# Patient Record
Sex: FEMALE | URBAN - METROPOLITAN AREA
[De-identification: names, ages, dates, MRNs, and addresses within clinical notes are randomized per-mention and may not be internally consistent; named-entity substitution may affect disease eponyms.]

---

## 2018-09-06 ENCOUNTER — APPOINTMENT (OUTPATIENT)
Age: 83
Setting detail: DERMATOLOGY
End: 2018-09-06

## 2018-09-06 DIAGNOSIS — L82.0 INFLAMED SEBORRHEIC KERATOSIS: ICD-10-CM

## 2018-09-06 DIAGNOSIS — L57.0 ACTINIC KERATOSIS: ICD-10-CM

## 2018-09-06 DIAGNOSIS — L23.9 ALLERGIC CONTACT DERMATITIS, UNSPECIFIED CAUSE: ICD-10-CM

## 2018-09-06 PROBLEM — D48.5 NEOPLASM OF UNCERTAIN BEHAVIOR OF SKIN: Status: ACTIVE | Noted: 2018-09-06

## 2018-09-06 PROCEDURE — 11311 SHAVE SKIN LESION 0.6-1.0 CM: CPT

## 2018-09-06 PROCEDURE — OTHER SHAVE REMOVAL: OTHER

## 2018-09-06 PROCEDURE — 11311 SHAVE SKIN LESION 0.6-1.0 CM: CPT | Mod: 76

## 2018-09-06 PROCEDURE — OTHER PRESCRIPTION: OTHER

## 2018-09-06 PROCEDURE — OTHER COUNSELING: OTHER

## 2018-09-06 PROCEDURE — OTHER BIOPSY BY PUNCH METHOD: OTHER

## 2018-09-06 PROCEDURE — 11300 SHAVE SKIN LESION 0.5 CM/<: CPT

## 2018-09-06 PROCEDURE — 99202 OFFICE O/P NEW SF 15 MIN: CPT | Mod: 25

## 2018-09-06 PROCEDURE — 11100: CPT | Mod: 59

## 2018-09-06 PROCEDURE — OTHER MIPS QUALITY: OTHER

## 2018-09-06 RX ORDER — TRIAMCINOLONE ACETONIDE 1 MG/G
CREAM TOPICAL
Qty: 1 | Refills: 1 | Status: ERX | COMMUNITY
Start: 2018-09-06

## 2018-09-06 ASSESSMENT — LOCATION ZONE DERM
LOCATION ZONE: FACE
LOCATION ZONE: LEG
LOCATION ZONE: NOSE

## 2018-09-06 ASSESSMENT — LOCATION DETAILED DESCRIPTION DERM
LOCATION DETAILED: LEFT LATERAL DISTAL PRETIBIAL REGION
LOCATION DETAILED: RIGHT DISTAL LATERAL CALF
LOCATION DETAILED: NASAL ROOT
LOCATION DETAILED: RIGHT SUPERIOR LATERAL BUCCAL CHEEK
LOCATION DETAILED: LEFT DISTAL PRETIBIAL REGION

## 2018-09-06 ASSESSMENT — LOCATION SIMPLE DESCRIPTION DERM
LOCATION SIMPLE: NOSE
LOCATION SIMPLE: RIGHT CHEEK
LOCATION SIMPLE: RIGHT CALF
LOCATION SIMPLE: LEFT PRETIBIAL REGION

## 2018-09-06 NOTE — PROCEDURE: SHAVE REMOVAL
Bill For Surgical Tray: no
Wound Care: Petrolatum
Notification Instructions: Patient will be notified of biopsy results. However, patient instructed to call the office if not contacted within 2 weeks.
Was A Bandage Applied: Yes
Medical Necessity Clause: This procedure was medically necessary because the lesion that was treated was:
Anesthesia Type: 1% lidocaine with epinephrine
Biopsy Method: Dermablade
Size Of Lesion In Cm (Required): 0.7
Consent was obtained from the patient. The risks and benefits to therapy were discussed in detail. Specifically, the risks of infection, scarring, bleeding, prolonged wound healing, incomplete removal, allergy to anesthesia, nerve injury and recurrence were addressed. Prior to the procedure, the treatment site was clearly identified and confirmed by the patient. All components of Universal Protocol/PAUSE Rule completed.
Detail Level: Detailed
Medical Necessity Information: It is in your best interest to select a reason for this procedure from the list below. All of these items fulfill various CMS LCD requirements except the new and changing color options.
Post-Care Instructions: I reviewed with the patient in detail post-care instructions. Patient is to keep the biopsy site dry overnight, and then apply bacitracin twice daily until healed. Patient may apply hydrogen peroxide soaks to remove any crusting.
Path Notes (To The Dermatopathologist): Please check margins.
Size Of Lesion In Cm (Required): 0.5
X Size Of Lesion In Cm (Optional): 0
Billing Type: Third-Party Bill
Hemostasis: Drysol
Size Of Lesion In Cm (Required): 0.6

## 2018-09-06 NOTE — PROCEDURE: BIOPSY BY PUNCH METHOD
Epidermal Sutures: 4-0 Ethilon
Post-Care Instructions: I reviewed with the patient in detail post-care instructions. Patient is to keep the biopsy site dry overnight, and then apply bacitracin twice daily until healed. Patient may apply hydrogen peroxide soaks to remove any crusting.
Anesthesia Volume In Cc (Will Not Render If 0): 0.5
Bill For Surgical Tray: no
Biopsy Type: H and E
X Depth Of Punch In Cm (Optional): 0
Was A Bandage Applied: Yes
Punch Size In Mm: 3
Notification Instructions: Patient will be notified of biopsy results. However, patient instructed to call the office if not contacted within 2 weeks.
Billing Type: Third-Party Bill
Hemostasis: None
Dressing: bandage
Home Suture Removal Text: Patient was provided a home suture removal kit and will remove their sutures at home.  If they have any questions or difficulties they will call the office.
Wound Care: Petrolatum
Consent: Written consent was obtained and risks were reviewed including but not limited to scarring, infection, bleeding, scabbing, incomplete removal, nerve damage and allergy to anesthesia.
Anesthesia Type: 1% lidocaine with epinephrine
Suture Removal: 14 days
Detail Level: Detailed

## 2018-09-24 ENCOUNTER — APPOINTMENT (OUTPATIENT)
Age: 83
Setting detail: DERMATOLOGY
End: 2018-09-24

## 2018-09-24 DIAGNOSIS — L82.0 INFLAMED SEBORRHEIC KERATOSIS: ICD-10-CM

## 2018-09-24 DIAGNOSIS — Z48.02 ENCOUNTER FOR REMOVAL OF SUTURES: ICD-10-CM

## 2018-09-24 PROBLEM — C44.329 SQUAMOUS CELL CARCINOMA OF SKIN OF OTHER PARTS OF FACE: Status: ACTIVE | Noted: 2018-09-24

## 2018-09-24 PROBLEM — C44.712 BASAL CELL CARCINOMA OF SKIN OF RIGHT LOWER LIMB, INCLUDING HIP: Status: ACTIVE | Noted: 2018-09-24

## 2018-09-24 PROCEDURE — 99213 OFFICE O/P EST LOW 20 MIN: CPT

## 2018-09-24 PROCEDURE — OTHER COUNSELING: OTHER

## 2018-09-24 PROCEDURE — OTHER POST-OP WOUND EVALUATION: OTHER

## 2018-09-24 PROCEDURE — OTHER CONSULTATION FOR MOHS SURGERY: OTHER

## 2018-09-24 PROCEDURE — OTHER REFERRAL: OTHER

## 2018-09-24 PROCEDURE — OTHER SUTURE REMOVAL (GLOBAL PERIOD): OTHER

## 2018-09-24 PROCEDURE — OTHER PRESCRIPTION: OTHER

## 2018-09-24 PROCEDURE — OTHER MIPS QUALITY: OTHER

## 2018-09-24 RX ORDER — MUPIROCIN 20 MG/G
OINTMENT TOPICAL BID
Qty: 1 | Refills: 1 | Status: ERX | COMMUNITY
Start: 2018-09-24

## 2018-09-24 ASSESSMENT — LOCATION DETAILED DESCRIPTION DERM
LOCATION DETAILED: RIGHT SUPERIOR LATERAL BUCCAL CHEEK
LOCATION DETAILED: LEFT DISTAL PRETIBIAL REGION
LOCATION DETAILED: RIGHT DISTAL LATERAL CALF
LOCATION DETAILED: NASAL ROOT

## 2018-09-24 ASSESSMENT — LOCATION SIMPLE DESCRIPTION DERM
LOCATION SIMPLE: RIGHT CALF
LOCATION SIMPLE: LEFT PRETIBIAL REGION
LOCATION SIMPLE: RIGHT CHEEK
LOCATION SIMPLE: NOSE

## 2018-09-24 ASSESSMENT — LOCATION ZONE DERM
LOCATION ZONE: NOSE
LOCATION ZONE: FACE
LOCATION ZONE: LEG

## 2018-09-24 NOTE — PROCEDURE: POST-OP WOUND EVALUATION
Wound Diameter In Cm(Optional): 0
Wound Crusting?: clean
Detail Level: Detailed
Add 11618 Cpt? (Important Note: In 2017 The Use Of 79854 Is Being Tracked By Cms To Determine Future Global Period Reimbursement For Global Periods): no

## 2018-09-24 NOTE — PROCEDURE: SUTURE REMOVAL (GLOBAL PERIOD)
Add 69973 Cpt? (Important Note: In 2017 The Use Of 75509 Is Being Tracked By Cms To Determine Future Global Period Reimbursement For Global Periods): no
Detail Level: Detailed

## 2018-10-19 ENCOUNTER — RX ONLY (RX ONLY)
Age: 83
End: 2018-10-19

## 2018-10-19 RX ORDER — CLINDAMYCIN HYDROCHLORIDE 300 MG/1
2 CAPSULE ORAL QD
Qty: 2 | Refills: 0 | Status: ERX | COMMUNITY
Start: 2018-10-19

## 2018-10-23 ENCOUNTER — APPOINTMENT (OUTPATIENT)
Age: 83
Setting detail: DERMATOLOGY
End: 2018-10-23

## 2018-10-23 PROBLEM — C44.329 SQUAMOUS CELL CARCINOMA OF SKIN OF OTHER PARTS OF FACE: Status: ACTIVE | Noted: 2018-10-23

## 2018-10-23 PROCEDURE — 13131 CMPLX RPR F/C/C/M/N/AX/G/H/F: CPT

## 2018-10-23 PROCEDURE — OTHER REPAIR NOTE: OTHER

## 2018-10-23 PROCEDURE — 17311 MOHS 1 STAGE H/N/HF/G: CPT

## 2018-10-23 PROCEDURE — OTHER MOHS SURGERY: OTHER

## 2018-10-30 ENCOUNTER — RX ONLY (RX ONLY)
Age: 83
End: 2018-10-30

## 2018-10-30 ENCOUNTER — APPOINTMENT (OUTPATIENT)
Age: 83
Setting detail: DERMATOLOGY
End: 2018-10-30

## 2018-10-30 DIAGNOSIS — Z48.02 ENCOUNTER FOR REMOVAL OF SUTURES: ICD-10-CM

## 2018-10-30 PROCEDURE — 99024 POSTOP FOLLOW-UP VISIT: CPT

## 2018-10-30 PROCEDURE — OTHER MIPS QUALITY: OTHER

## 2018-10-30 PROCEDURE — OTHER SUTURE REMOVAL (GLOBAL PERIOD): OTHER

## 2018-10-30 RX ORDER — CLINDAMYCIN HYDROCHLORIDE 300 MG/1
2 CAPSULE ORAL QD
Qty: 2 | Refills: 0 | Status: ERX

## 2018-10-30 ASSESSMENT — LOCATION ZONE DERM: LOCATION ZONE: FACE

## 2018-10-30 ASSESSMENT — LOCATION DETAILED DESCRIPTION DERM: LOCATION DETAILED: RIGHT SUPERIOR LATERAL BUCCAL CHEEK

## 2018-10-30 ASSESSMENT — LOCATION SIMPLE DESCRIPTION DERM: LOCATION SIMPLE: RIGHT CHEEK

## 2018-10-30 NOTE — PROCEDURE: SUTURE REMOVAL (GLOBAL PERIOD)
Detail Level: Detailed
Add 73133 Cpt? (Important Note: In 2017 The Use Of 53766 Is Being Tracked By Cms To Determine Future Global Period Reimbursement For Global Periods): yes

## 2018-11-06 ENCOUNTER — APPOINTMENT (OUTPATIENT)
Age: 83
Setting detail: DERMATOLOGY
End: 2018-11-06

## 2018-11-06 PROBLEM — C44.712 BASAL CELL CARCINOMA OF SKIN OF RIGHT LOWER LIMB, INCLUDING HIP: Status: ACTIVE | Noted: 2018-11-06

## 2018-11-06 PROCEDURE — OTHER CRYOSURGICAL DESTRUCTION: OTHER

## 2018-11-06 PROCEDURE — 17263 DSTRJ MAL LES T/A/L 2.1-3.0: CPT

## 2018-11-06 PROCEDURE — OTHER PRESCRIPTION: OTHER

## 2018-11-06 RX ORDER — MUPIROCIN 20 MG/G
OINTMENT TOPICAL
Qty: 1 | Refills: 0 | Status: ERX

## 2018-11-06 NOTE — PROCEDURE: CRYOSURGICAL DESTRUCTION
Add Intralesional Injection: No
Total Time In Minutes: 3 minutes
Anesthesia Volume In Cc: 0
Additional Information: (Optional): The wound was cleaned, and a dressing was applied.  The patient received detailed post-op instructions.
Pre-Procedure: The surgical site was antiseptically prepared.
Consent was obtained from the patient. The risks and benefits to therapy were discussed in detail. Specifically, the risks of infection, scarring, bleeding, prolonged wound healing, incomplete removal, allergy to anesthesia, nerve injury and recurrence were addressed. Alternatives to liquid nitrogen, such as ED&C, surgical removal, XRT were also discussed.  Prior to the procedure, the treatment site was clearly identified and confirmed by the patient. All components of Universal Protocol/PAUSE Rule completed.
Detail Level: Detailed
Post-Care Instructions: I reviewed with the patient in detail post-care instructions. Patient is to keep the area dry for 48 hours, and not to engage in any heavy lifting, exercise, or swimming for the next 14 days. Should the patient develop any fevers, chills, bleeding, severe pain patient will contact the office immediately.
Number Of Freeze-Thaw Cycles: 3 freeze-thaw cycles
Size Of Lesion In Cm: 2.6
Total Volume (Ccs): 1
Medication Injected: 5-Fluorouracil
Bill As A Line Item Or As Units: Line Item

## 2018-11-12 ENCOUNTER — APPOINTMENT (OUTPATIENT)
Age: 83
Setting detail: DERMATOLOGY
End: 2018-11-12

## 2018-11-12 DIAGNOSIS — Z48.02 ENCOUNTER FOR REMOVAL OF SUTURES: ICD-10-CM

## 2018-11-12 PROBLEM — C44.91 BASAL CELL CARCINOMA OF SKIN, UNSPECIFIED: Status: ACTIVE | Noted: 2018-11-12

## 2018-11-12 PROCEDURE — OTHER POST-OP WOUND CHECK: OTHER

## 2018-11-12 PROCEDURE — OTHER MIPS QUALITY: OTHER

## 2018-11-12 PROCEDURE — 99024 POSTOP FOLLOW-UP VISIT: CPT

## 2018-11-12 PROCEDURE — OTHER PRESCRIPTION: OTHER

## 2018-11-12 RX ORDER — CLINDAMYCIN HYDROCHLORIDE 300 MG/1
CAPSULE ORAL
Qty: 30 | Refills: 0 | Status: ERX

## 2018-11-12 RX ORDER — MUPIROCIN 20 MG/G
OINTMENT TOPICAL
Qty: 1 | Refills: 0 | Status: ERX

## 2018-11-12 ASSESSMENT — LOCATION SIMPLE DESCRIPTION DERM: LOCATION SIMPLE: RIGHT ANKLE

## 2018-11-12 ASSESSMENT — LOCATION ZONE DERM: LOCATION ZONE: LEG

## 2018-11-12 ASSESSMENT — LOCATION DETAILED DESCRIPTION DERM: LOCATION DETAILED: RIGHT POSTERIOR ANKLE

## 2018-11-12 NOTE — PROCEDURE: POST-OP WOUND CHECK
Detail Level: Detailed
Add 69131 Cpt? (Important Note: In 2017 The Use Of 81259 Is Being Tracked By Cms To Determine Future Global Period Reimbursement For Global Periods): no

## 2018-11-26 ENCOUNTER — APPOINTMENT (OUTPATIENT)
Age: 83
Setting detail: DERMATOLOGY
End: 2018-11-26

## 2018-11-26 DIAGNOSIS — Z48.02 ENCOUNTER FOR REMOVAL OF SUTURES: ICD-10-CM

## 2018-11-26 PROCEDURE — 99024 POSTOP FOLLOW-UP VISIT: CPT

## 2018-11-26 PROCEDURE — OTHER POST-OP WOUND CHECK: OTHER

## 2018-11-26 ASSESSMENT — LOCATION SIMPLE DESCRIPTION DERM: LOCATION SIMPLE: RIGHT ANKLE

## 2018-11-26 ASSESSMENT — LOCATION ZONE DERM: LOCATION ZONE: LEG

## 2018-11-26 ASSESSMENT — LOCATION DETAILED DESCRIPTION DERM: LOCATION DETAILED: RIGHT POSTERIOR ANKLE

## 2018-11-26 NOTE — PROCEDURE: POST-OP WOUND CHECK
Add 77365 Cpt? (Important Note: In 2017 The Use Of 06221 Is Being Tracked By Cms To Determine Future Global Period Reimbursement For Global Periods): no
Detail Level: Detailed

## 2018-12-18 ENCOUNTER — APPOINTMENT (OUTPATIENT)
Age: 83
Setting detail: DERMATOLOGY
End: 2018-12-18

## 2018-12-18 DIAGNOSIS — T81.89 OTHER COMPLICATIONS OF PROCEDURES, NOT ELSEWHERE CLASSIFIED: ICD-10-CM

## 2018-12-18 PROBLEM — T81.89XA OTHER COMPLICATIONS OF PROCEDURES, NOT ELSEWHERE CLASSIFIED, INITIAL ENCOUNTER: Status: ACTIVE | Noted: 2018-12-18

## 2018-12-18 PROCEDURE — OTHER MIPS QUALITY: OTHER

## 2018-12-18 PROCEDURE — OTHER COUNSELING: OTHER

## 2018-12-18 PROCEDURE — 99024 POSTOP FOLLOW-UP VISIT: CPT

## 2018-12-18 PROCEDURE — OTHER ADDITIONAL NOTES: OTHER

## 2018-12-18 ASSESSMENT — LOCATION SIMPLE DESCRIPTION DERM: LOCATION SIMPLE: RIGHT ANKLE

## 2018-12-18 ASSESSMENT — LOCATION DETAILED DESCRIPTION DERM: LOCATION DETAILED: RIGHT POSTERIOR ANKLE

## 2018-12-18 ASSESSMENT — LOCATION ZONE DERM: LOCATION ZONE: LEG

## 2019-07-10 NOTE — PROCEDURE: REPAIR NOTE
Chart. Met with the pt. Role of dcp explained. Resource folder provided to the pt. Pt plans to f/u with [facility] as out-patient. CM left vm for [facility] r/t d/c back to [facility] today. Pt declines any further assistance at this time. Discharge timeout done with REY Kowalski. All discharge needs and concerns addressed. Explained Case Management role/services. Dorsal Nasal Flap Text: The defect edges were debeveled with a #15 scalpel blade.  Given the location of the defect and the proximity to free margins a dorsal nasal flap was deemed most appropriate.  Using a sterile surgical marker, an appropriate dorsal nasal flap was drawn around the defect.    The area thus outlined was incised deep to adipose tissue with a #15 scalpel blade.  The skin margins were undermined to an appropriate distance in all directions utilizing iris scissors.

## 2019-11-22 NOTE — PROCEDURE: REPAIR NOTE
Spine appears normal, range of motion is not limited, no muscle or joint tenderness, no edema. Secondary Intention Text (Leave Blank If You Do Not Want): The defect will heal with secondary intention.

## 2021-04-02 ENCOUNTER — APPOINTMENT (OUTPATIENT)
Age: 86
Setting detail: DERMATOLOGY
End: 2021-04-05

## 2021-04-02 DIAGNOSIS — L57.0 ACTINIC KERATOSIS: ICD-10-CM

## 2021-04-02 DIAGNOSIS — Z86.007 PERSONAL HISTORY OF IN-SITU NEOPLASM OF SKIN: ICD-10-CM

## 2021-04-02 PROBLEM — Z85.828 PERSONAL HISTORY OF OTHER MALIGNANT NEOPLASM OF SKIN: Status: ACTIVE | Noted: 2021-04-02

## 2021-04-02 PROBLEM — D48.5 NEOPLASM OF UNCERTAIN BEHAVIOR OF SKIN: Status: ACTIVE | Noted: 2021-04-02

## 2021-04-02 PROCEDURE — OTHER MIPS QUALITY: OTHER

## 2021-04-02 PROCEDURE — 17003 DESTRUCT PREMALG LES 2-14: CPT

## 2021-04-02 PROCEDURE — OTHER COUNSELING: OTHER

## 2021-04-02 PROCEDURE — OTHER BIOPSY BY SHAVE METHOD: OTHER

## 2021-04-02 PROCEDURE — OTHER LIQUID NITROGEN: OTHER

## 2021-04-02 PROCEDURE — 99212 OFFICE O/P EST SF 10 MIN: CPT | Mod: 25

## 2021-04-02 PROCEDURE — 17000 DESTRUCT PREMALG LESION: CPT | Mod: 59

## 2021-04-02 PROCEDURE — 11102 TANGNTL BX SKIN SINGLE LES: CPT

## 2021-04-02 ASSESSMENT — LOCATION ZONE DERM
LOCATION ZONE: FACE
LOCATION ZONE: LEG

## 2021-04-02 ASSESSMENT — LOCATION DETAILED DESCRIPTION DERM
LOCATION DETAILED: RIGHT DISTAL LATERAL CALF
LOCATION DETAILED: LEFT MID TEMPLE
LOCATION DETAILED: LEFT DISTAL PRETIBIAL REGION
LOCATION DETAILED: LEFT SUPERIOR LATERAL MALAR CHEEK

## 2021-04-02 ASSESSMENT — LOCATION SIMPLE DESCRIPTION DERM
LOCATION SIMPLE: LEFT TEMPLE
LOCATION SIMPLE: LEFT CHEEK
LOCATION SIMPLE: RIGHT CALF
LOCATION SIMPLE: LEFT PRETIBIAL REGION

## 2021-04-02 NOTE — PROCEDURE: BIOPSY BY SHAVE METHOD
Hide Anticipated Plan (Based On Presumed Biopsy Results)?: No
Cryotherapy Text: The wound bed was treated with cryotherapy after the biopsy was performed.
Post-Care Instructions: I reviewed with the patient in detail post-care instructions. Patient is to keep the biopsy site dry overnight, and then apply bacitracin twice daily until healed. Patient may apply hydrogen peroxide soaks to remove any crusting.
Electrodesiccation Text: The wound bed was treated with electrodesiccation after the biopsy was performed.
Curettage Text: The wound bed was treated with curettage after the biopsy was performed.
Notification Instructions: Patient will be notified of biopsy results. However, patient instructed to call the office if not contacted within 2 weeks.
Consent: Written consent was obtained and risks were reviewed including but not limited to scarring, infection, bleeding, scabbing, incomplete removal, nerve damage and allergy to anesthesia.
Depth Of Biopsy: dermis
Anesthesia Volume In Cc (Will Not Render If 0): 0.5
Size Of Lesion In Cm: 0
Anesthesia Type: 1% lidocaine with epinephrine
Billing Type: Third-Party Bill
Electrodesiccation And Curettage Text: The wound bed was treated with electrodesiccation and curettage after the biopsy was performed.
Wound Care: Petrolatum
Biopsy Method: Dermablade
Detail Level: Detailed
Silver Nitrate Text: The wound bed was treated with silver nitrate after the biopsy was performed.
Dressing: bandage
Hemostasis: Pressure
Was A Bandage Applied: Yes
Biopsy Type: H and E
Type Of Destruction Used: Curettage
Information: Selecting Yes will display possible errors in your note based on the variables you have selected. This validation is only offered as a suggestion for you. PLEASE NOTE THAT THE VALIDATION TEXT WILL BE REMOVED WHEN YOU FINALIZE YOUR NOTE. IF YOU WANT TO FAX A PRELIMINARY NOTE YOU WILL NEED TO TOGGLE THIS TO 'NO' IF YOU DO NOT WANT IT IN YOUR FAXED NOTE.

## 2021-04-06 ENCOUNTER — APPOINTMENT (OUTPATIENT)
Age: 86
Setting detail: DERMATOLOGY
End: 2021-04-12

## 2021-04-06 DIAGNOSIS — K6811 OTHER POSTOPERATIVE INFECTION: ICD-10-CM

## 2021-04-06 DIAGNOSIS — T814XXA OTHER POSTOPERATIVE INFECTION: ICD-10-CM

## 2021-04-06 PROBLEM — T81.40XA INFECTION FOLLOWING A PROCEDURE, UNSPECIFIED, INITIAL ENCOUNTER: Status: ACTIVE | Noted: 2021-04-06

## 2021-04-06 PROCEDURE — OTHER PRESCRIPTION: OTHER

## 2021-04-06 PROCEDURE — OTHER COUNSELING: OTHER

## 2021-04-06 PROCEDURE — OTHER MIPS QUALITY: OTHER

## 2021-04-06 PROCEDURE — 99213 OFFICE O/P EST LOW 20 MIN: CPT | Mod: 24

## 2021-04-06 RX ORDER — CEPHALEXIN 500 MG/1
TABLET ORAL QID
Qty: 28 | Refills: 3 | Status: ERX | COMMUNITY
Start: 2021-04-06

## 2021-04-06 RX ORDER — MUPIROCIN 20 MG/G
OINTMENT TOPICAL
Qty: 1 | Refills: 0 | Status: ERX

## 2021-04-06 ASSESSMENT — LOCATION DETAILED DESCRIPTION DERM: LOCATION DETAILED: LEFT DISTAL PRETIBIAL REGION

## 2021-04-06 ASSESSMENT — LOCATION SIMPLE DESCRIPTION DERM: LOCATION SIMPLE: LEFT PRETIBIAL REGION

## 2021-04-06 ASSESSMENT — LOCATION ZONE DERM: LOCATION ZONE: LEG

## 2021-04-12 ENCOUNTER — APPOINTMENT (OUTPATIENT)
Age: 86
Setting detail: DERMATOLOGY
End: 2021-04-22

## 2021-04-12 DIAGNOSIS — K6811 OTHER POSTOPERATIVE INFECTION: ICD-10-CM

## 2021-04-12 DIAGNOSIS — Z86.007 PERSONAL HISTORY OF IN-SITU NEOPLASM OF SKIN: ICD-10-CM

## 2021-04-12 DIAGNOSIS — T814XXA OTHER POSTOPERATIVE INFECTION: ICD-10-CM

## 2021-04-12 PROBLEM — T81.40XA INFECTION FOLLOWING A PROCEDURE, UNSPECIFIED, INITIAL ENCOUNTER: Status: ACTIVE | Noted: 2021-04-12

## 2021-04-12 PROBLEM — Z85.828 PERSONAL HISTORY OF OTHER MALIGNANT NEOPLASM OF SKIN: Status: ACTIVE | Noted: 2021-04-12

## 2021-04-12 PROCEDURE — OTHER MIPS QUALITY: OTHER

## 2021-04-12 PROCEDURE — OTHER ORDER TESTS: OTHER

## 2021-04-12 PROCEDURE — OTHER COUNSELING: OTHER

## 2021-04-12 PROCEDURE — 99212 OFFICE O/P EST SF 10 MIN: CPT | Mod: 24

## 2021-04-12 PROCEDURE — OTHER ADDITIONAL NOTES: OTHER

## 2021-04-12 ASSESSMENT — LOCATION SIMPLE DESCRIPTION DERM
LOCATION SIMPLE: LEFT PRETIBIAL REGION
LOCATION SIMPLE: RIGHT CALF

## 2021-04-12 ASSESSMENT — LOCATION DETAILED DESCRIPTION DERM
LOCATION DETAILED: RIGHT DISTAL LATERAL CALF
LOCATION DETAILED: LEFT DISTAL PRETIBIAL REGION

## 2021-04-12 ASSESSMENT — LOCATION ZONE DERM: LOCATION ZONE: LEG

## 2021-04-12 NOTE — PROCEDURE: ADDITIONAL NOTES
Render Risk Assessment In Note?: no
Detail Level: Simple
Additional Notes: Discussed radiation vs liquid nitrogen and wrote out a letter to beronica’s daughter who is a nurse.

## 2021-09-22 ENCOUNTER — APPOINTMENT (OUTPATIENT)
Age: 86
Setting detail: DERMATOLOGY
End: 2021-09-22

## 2021-09-22 DIAGNOSIS — L57.0 ACTINIC KERATOSIS: ICD-10-CM

## 2021-09-22 DIAGNOSIS — L21.8 OTHER SEBORRHEIC DERMATITIS: ICD-10-CM

## 2021-09-22 DIAGNOSIS — Z85.828 PERSONAL HISTORY OF OTHER MALIGNANT NEOPLASM OF SKIN: ICD-10-CM

## 2021-09-22 PROBLEM — D48.5 NEOPLASM OF UNCERTAIN BEHAVIOR OF SKIN: Status: ACTIVE | Noted: 2021-09-22

## 2021-09-22 PROCEDURE — OTHER BIOPSY BY PUNCH METHOD: OTHER

## 2021-09-22 PROCEDURE — 17000 DESTRUCT PREMALG LESION: CPT | Mod: 59

## 2021-09-22 PROCEDURE — OTHER ADDITIONAL NOTES: OTHER

## 2021-09-22 PROCEDURE — 11105 PUNCH BX SKIN EA SEP/ADDL: CPT

## 2021-09-22 PROCEDURE — OTHER COUNSELING: OTHER

## 2021-09-22 PROCEDURE — OTHER LIQUID NITROGEN: OTHER

## 2021-09-22 PROCEDURE — 17003 DESTRUCT PREMALG LES 2-14: CPT

## 2021-09-22 PROCEDURE — 99213 OFFICE O/P EST LOW 20 MIN: CPT | Mod: 25

## 2021-09-22 PROCEDURE — 11104 PUNCH BX SKIN SINGLE LESION: CPT

## 2021-09-22 PROCEDURE — OTHER MIPS QUALITY: OTHER

## 2021-09-22 PROCEDURE — OTHER PRESCRIPTION: OTHER

## 2021-09-22 RX ORDER — KETOCONAZOLE 20 MG/G
1 CREAM TOPICAL BID
Qty: 60 | Refills: 1 | Status: ERX | COMMUNITY
Start: 2021-09-22

## 2021-09-22 ASSESSMENT — LOCATION SIMPLE DESCRIPTION DERM
LOCATION SIMPLE: LEFT NOSE
LOCATION SIMPLE: RIGHT EAR
LOCATION SIMPLE: LEFT ZYGOMA
LOCATION SIMPLE: LEFT CHEEK
LOCATION SIMPLE: LEFT EAR
LOCATION SIMPLE: RIGHT CHEEK
LOCATION SIMPLE: RIGHT CALF

## 2021-09-22 ASSESSMENT — LOCATION DETAILED DESCRIPTION DERM
LOCATION DETAILED: RIGHT CRUS OF HELIX
LOCATION DETAILED: RIGHT DISTAL LATERAL CALF
LOCATION DETAILED: LEFT NASAL SIDEWALL
LOCATION DETAILED: LEFT SUPERIOR MEDIAL MALAR CHEEK
LOCATION DETAILED: LEFT LATERAL ZYGOMA
LOCATION DETAILED: LEFT CENTRAL ZYGOMA
LOCATION DETAILED: RIGHT SUPERIOR CENTRAL MALAR CHEEK
LOCATION DETAILED: LEFT CRUS OF HELIX

## 2021-09-22 ASSESSMENT — LOCATION ZONE DERM
LOCATION ZONE: LEG
LOCATION ZONE: EAR
LOCATION ZONE: FACE
LOCATION ZONE: NOSE

## 2021-09-22 NOTE — PROCEDURE: ADDITIONAL NOTES
Additional Notes: Denies visual changes or complaints after cryo
Render Risk Assessment In Note?: no
Detail Level: Simple

## 2021-09-22 NOTE — PROCEDURE: LIQUID NITROGEN
Consent: The patient's consent was obtained including but not limited to risks of crusting, scabbing, blistering, scarring, darker or lighter pigmentary change, recurrence, incomplete removal and infection.
Post-Care Instructions: I reviewed with the patient in detail post-care instructions. Patient is to wear sunprotection, and avoid picking at any of the treated lesions. Pt may apply Vaseline to crusted or scabbing areas.
Show Aperture Variable?: Yes
Render Post-Care Instructions In Note?: no
Duration Of Freeze Thaw-Cycle (Seconds): 0
Detail Level: Detailed

## 2021-09-22 NOTE — PROCEDURE: BIOPSY BY PUNCH METHOD
Was A Bandage Applied: Yes
Notification Instructions: Patient will be notified of biopsy results. However, patient instructed to call the office if not contacted within 2 weeks.
Wound Care: Petrolatum
Bill For Surgical Tray: no
Consent: Written consent was obtained and risks were reviewed including but not limited to scarring, infection, bleeding, scabbing, incomplete removal, nerve damage and allergy to anesthesia.
Biopsy Type: H and E
Anesthesia Type: 1% lidocaine with epinephrine
Punch Size In Mm: 3
X Depth Of Punch In Cm (Optional): 0
Detail Level: Detailed
Billing Type: Third-Party Bill
Anesthesia Volume In Cc (Will Not Render If 0): 0.5
Post-Care Instructions: I reviewed with the patient in detail post-care instructions. Patient is to keep the biopsy site dry overnight, and then apply bacitracin twice daily until healed. Patient may apply hydrogen peroxide soaks to remove any crusting.
Epidermal Sutures: 4-0 Ethilon
Dressing: bandage
Home Suture Removal Text: Patient was provided a home suture removal kit and will remove their sutures at home.  If they have any questions or difficulties they will call the office.
Information: Selecting Yes will display possible errors in your note based on the variables you have selected. This validation is only offered as a suggestion for you. PLEASE NOTE THAT THE VALIDATION TEXT WILL BE REMOVED WHEN YOU FINALIZE YOUR NOTE. IF YOU WANT TO FAX A PRELIMINARY NOTE YOU WILL NEED TO TOGGLE THIS TO 'NO' IF YOU DO NOT WANT IT IN YOUR FAXED NOTE.
Hemostasis: None
Suture Removal: 14 days

## 2021-09-30 ENCOUNTER — APPOINTMENT (OUTPATIENT)
Age: 86
Setting detail: DERMATOLOGY
End: 2021-09-30

## 2021-09-30 DIAGNOSIS — L57.0 ACTINIC KERATOSIS: ICD-10-CM

## 2021-09-30 PROCEDURE — 17003 DESTRUCT PREMALG LES 2-14: CPT | Mod: 79

## 2021-09-30 PROCEDURE — OTHER ADDITIONAL NOTES: OTHER

## 2021-09-30 PROCEDURE — 17000 DESTRUCT PREMALG LESION: CPT | Mod: 79

## 2021-09-30 PROCEDURE — OTHER LIQUID NITROGEN: OTHER

## 2021-09-30 PROCEDURE — OTHER COUNSELING: OTHER

## 2021-09-30 PROCEDURE — OTHER PATHOLOGY DISCUSSION: OTHER

## 2021-09-30 ASSESSMENT — LOCATION SIMPLE DESCRIPTION DERM
LOCATION SIMPLE: RIGHT CHEEK
LOCATION SIMPLE: LEFT ZYGOMA

## 2021-09-30 ASSESSMENT — LOCATION DETAILED DESCRIPTION DERM
LOCATION DETAILED: LEFT LATERAL ZYGOMA
LOCATION DETAILED: LEFT CENTRAL ZYGOMA
LOCATION DETAILED: RIGHT SUPERIOR CENTRAL MALAR CHEEK

## 2021-09-30 ASSESSMENT — LOCATION ZONE DERM: LOCATION ZONE: FACE

## 2021-09-30 NOTE — PROCEDURE: ADDITIONAL NOTES
Detail Level: Simple
Additional Notes: Offered patient field therapy treatment or creams for actinic keratosis, in which patient declined.
Render Risk Assessment In Note?: no

## 2021-09-30 NOTE — PROCEDURE: LIQUID NITROGEN
Detail Level: Detailed
Render Note In Bullet Format When Appropriate: No
Show Aperture Variable?: Yes
Duration Of Freeze Thaw-Cycle (Seconds): 0
Consent: The patient's consent was obtained including but not limited to risks of crusting, scabbing, blistering, scarring, darker or lighter pigmentary change, recurrence, incomplete removal and infection.
Post-Care Instructions: I reviewed with the patient in detail post-care instructions. Patient is to wear sunprotection, and avoid picking at any of the treated lesions. Pt may apply Vaseline to crusted or scabbing areas.

## 2021-11-04 ENCOUNTER — APPOINTMENT (OUTPATIENT)
Age: 86
Setting detail: DERMATOLOGY
End: 2021-11-04

## 2021-11-04 DIAGNOSIS — Z85.828 PERSONAL HISTORY OF OTHER MALIGNANT NEOPLASM OF SKIN: ICD-10-CM

## 2021-11-04 DIAGNOSIS — L85.3 XEROSIS CUTIS: ICD-10-CM

## 2021-11-04 DIAGNOSIS — L57.0 ACTINIC KERATOSIS: ICD-10-CM

## 2021-11-04 PROCEDURE — 17000 DESTRUCT PREMALG LESION: CPT

## 2021-11-04 PROCEDURE — OTHER COUNSELING: OTHER

## 2021-11-04 PROCEDURE — OTHER ADDITIONAL NOTES: OTHER

## 2021-11-04 PROCEDURE — 99212 OFFICE O/P EST SF 10 MIN: CPT | Mod: 25

## 2021-11-04 PROCEDURE — 17003 DESTRUCT PREMALG LES 2-14: CPT

## 2021-11-04 PROCEDURE — OTHER PRESCRIPTION: OTHER

## 2021-11-04 PROCEDURE — OTHER LIQUID NITROGEN: OTHER

## 2021-11-04 RX ORDER — AMMONIUM LACTATE 12 G/100G
1 LOTION TOPICAL QD
Qty: 400 | Refills: 2 | Status: ERX | COMMUNITY
Start: 2021-11-04

## 2021-11-04 ASSESSMENT — LOCATION SIMPLE DESCRIPTION DERM
LOCATION SIMPLE: LEFT EYEBROW
LOCATION SIMPLE: RIGHT CALF
LOCATION SIMPLE: LEFT ZYGOMA
LOCATION SIMPLE: LEFT PRETIBIAL REGION
LOCATION SIMPLE: LEFT TEMPLE
LOCATION SIMPLE: RIGHT CHEEK

## 2021-11-04 ASSESSMENT — LOCATION DETAILED DESCRIPTION DERM
LOCATION DETAILED: LEFT LATERAL ZYGOMA
LOCATION DETAILED: RIGHT DISTAL LATERAL CALF
LOCATION DETAILED: LEFT DISTAL PRETIBIAL REGION
LOCATION DETAILED: LEFT CENTRAL ZYGOMA
LOCATION DETAILED: RIGHT SUPERIOR CENTRAL MALAR CHEEK
LOCATION DETAILED: LEFT LATERAL EYEBROW
LOCATION DETAILED: LEFT INFERIOR TEMPLE
LOCATION DETAILED: LEFT MID TEMPLE

## 2021-11-04 ASSESSMENT — LOCATION ZONE DERM
LOCATION ZONE: FACE
LOCATION ZONE: LEG

## 2021-11-04 NOTE — PROCEDURE: ADDITIONAL NOTES
Additional Notes: Offered patient field therapy treatment or creams for actinic keratosis, in which patient declined.
Detail Level: Simple
Render Risk Assessment In Note?: no

## 2021-11-04 NOTE — PROCEDURE: LIQUID NITROGEN
Render Post-Care Instructions In Note?: no
Post-Care Instructions: I reviewed with the patient in detail post-care instructions. Patient is to wear sunprotection, and avoid picking at any of the treated lesions. Pt may apply Vaseline to crusted or scabbing areas.
Show Aperture Variable?: Yes
Detail Level: Detailed
Duration Of Freeze Thaw-Cycle (Seconds): 0
Consent: The patient's consent was obtained including but not limited to risks of crusting, scabbing, blistering, scarring, darker or lighter pigmentary change, recurrence, incomplete removal and infection.

## 2022-11-22 ENCOUNTER — APPOINTMENT (OUTPATIENT)
Age: 87
Setting detail: DERMATOLOGY
End: 2022-11-23

## 2022-11-22 DIAGNOSIS — Z71.89 OTHER SPECIFIED COUNSELING: ICD-10-CM

## 2022-11-22 DIAGNOSIS — D49.2 NEOPLASM OF UNSPECIFIED BEHAVIOR OF BONE, SOFT TISSUE, AND SKIN: ICD-10-CM

## 2022-11-22 DIAGNOSIS — L82.0 INFLAMED SEBORRHEIC KERATOSIS: ICD-10-CM

## 2022-11-22 DIAGNOSIS — L57.0 ACTINIC KERATOSIS: ICD-10-CM

## 2022-11-22 DIAGNOSIS — L82.1 OTHER SEBORRHEIC KERATOSIS: ICD-10-CM

## 2022-11-22 PROCEDURE — 17000 DESTRUCT PREMALG LESION: CPT | Mod: 59

## 2022-11-22 PROCEDURE — OTHER BIOPSY BY PUNCH METHOD: OTHER

## 2022-11-22 PROCEDURE — OTHER COUNSELING: OTHER

## 2022-11-22 PROCEDURE — OTHER LIQUID NITROGEN: OTHER

## 2022-11-22 PROCEDURE — 11104 PUNCH BX SKIN SINGLE LESION: CPT

## 2022-11-22 PROCEDURE — 99212 OFFICE O/P EST SF 10 MIN: CPT | Mod: 25

## 2022-11-22 PROCEDURE — OTHER SUNSCREEN RECOMMENDATIONS: OTHER

## 2022-11-22 PROCEDURE — 17110 DESTRUCT B9 LESION 1-14: CPT | Mod: 59

## 2022-11-22 ASSESSMENT — LOCATION SIMPLE DESCRIPTION DERM
LOCATION SIMPLE: RIGHT UPPER BACK
LOCATION SIMPLE: RIGHT THIGH
LOCATION SIMPLE: LEFT PRETIBIAL REGION
LOCATION SIMPLE: LEFT CHEEK
LOCATION SIMPLE: LEFT UPPER BACK

## 2022-11-22 ASSESSMENT — LOCATION ZONE DERM
LOCATION ZONE: LEG
LOCATION ZONE: TRUNK
LOCATION ZONE: FACE

## 2022-11-22 ASSESSMENT — LOCATION DETAILED DESCRIPTION DERM
LOCATION DETAILED: RIGHT ANTERIOR DISTAL THIGH
LOCATION DETAILED: RIGHT INFERIOR UPPER BACK
LOCATION DETAILED: LEFT MID-UPPER BACK
LOCATION DETAILED: LEFT SUPERIOR CENTRAL MALAR CHEEK
LOCATION DETAILED: LEFT PROXIMAL PRETIBIAL REGION

## 2022-11-22 NOTE — PROCEDURE: BIOPSY BY PUNCH METHOD
Wound Care: Bacitracin
Patient Will Remove Sutures At Home?: No
Suture Removal: 14 days
Billing Type: Third-Party Bill
Detail Level: Detailed
Anesthesia Type: 1% lidocaine with epinephrine
Size Of Lesion In Cm (Optional): 0
Hemostasis: None
Biopsy Type: H and E
Notification Instructions: Patient will be notified of biopsy results. However, patient instructed to call the office if not contacted within 2 weeks.
Was A Bandage Applied: Yes
Dressing: bandage
Information: Selecting Yes will display possible errors in your note based on the variables you have selected. This validation is only offered as a suggestion for you. PLEASE NOTE THAT THE VALIDATION TEXT WILL BE REMOVED WHEN YOU FINALIZE YOUR NOTE. IF YOU WANT TO FAX A PRELIMINARY NOTE YOU WILL NEED TO TOGGLE THIS TO 'NO' IF YOU DO NOT WANT IT IN YOUR FAXED NOTE.
Anesthesia Volume In Cc (Will Not Render If 0): 0.5
Epidermal Sutures: 4-0 Ethilon
Punch Size In Mm: 6
Post-Care Instructions: I reviewed with the patient in detail post-care instructions. Patient is to keep the biopsy site dry overnight, and then apply bacitracin twice daily until healed. Patient may apply hydrogen peroxide soaks to remove any crusting.
Consent: Written consent was obtained and risks were reviewed including but not limited to scarring, infection, bleeding, scabbing, incomplete removal, nerve damage and allergy to anesthesia.
Home Suture Removal Text: Patient was provided a home suture removal kit and will remove their sutures at home.  If they have any questions or difficulties they will call the office.

## 2022-11-22 NOTE — PROCEDURE: LIQUID NITROGEN
Show Applicator Variable?: Yes
Render Note In Bullet Format When Appropriate: No
Post-Care Instructions: I reviewed with the patient in detail post-care instructions. Patient is to wear sunprotection, and avoid picking at any of the treated lesions. Pt may apply Vaseline to crusted or scabbing areas.
Medical Necessity Information: It is in your best interest to select a reason for this procedure from the list below. All of these items fulfill various CMS LCD requirements except the new and changing color options.
Detail Level: Detailed
Duration Of Freeze Thaw-Cycle (Seconds): 0
Consent: The patient's consent was obtained including but not limited to risks of crusting, scabbing, blistering, scarring, darker or lighter pigmentary change, recurrence, incomplete removal and infection.
Spray Paint Text: The liquid nitrogen was applied to the skin utilizing a spray paint frosting technique.
Medical Necessity Clause: This procedure was medically necessary because the lesions that were treated were:

## 2022-12-06 ENCOUNTER — APPOINTMENT (OUTPATIENT)
Age: 87
Setting detail: DERMATOLOGY
End: 2022-12-06

## 2022-12-06 DIAGNOSIS — L82.1 OTHER SEBORRHEIC KERATOSIS: ICD-10-CM

## 2022-12-06 DIAGNOSIS — L57.0 ACTINIC KERATOSIS: ICD-10-CM

## 2022-12-06 DIAGNOSIS — Z71.89 OTHER SPECIFIED COUNSELING: ICD-10-CM

## 2022-12-06 DIAGNOSIS — L82.0 INFLAMED SEBORRHEIC KERATOSIS: ICD-10-CM

## 2022-12-06 PROCEDURE — 17003 DESTRUCT PREMALG LES 2-14: CPT

## 2022-12-06 PROCEDURE — 99213 OFFICE O/P EST LOW 20 MIN: CPT | Mod: 25

## 2022-12-06 PROCEDURE — OTHER COUNSELING: OTHER

## 2022-12-06 PROCEDURE — 17000 DESTRUCT PREMALG LESION: CPT

## 2022-12-06 PROCEDURE — OTHER LIQUID NITROGEN: OTHER

## 2022-12-06 ASSESSMENT — LOCATION SIMPLE DESCRIPTION DERM
LOCATION SIMPLE: LEFT UPPER BACK
LOCATION SIMPLE: LEFT PRETIBIAL REGION
LOCATION SIMPLE: LEFT TEMPLE
LOCATION SIMPLE: RIGHT UPPER BACK

## 2022-12-06 ASSESSMENT — LOCATION DETAILED DESCRIPTION DERM
LOCATION DETAILED: LEFT INFERIOR TEMPLE
LOCATION DETAILED: RIGHT INFERIOR UPPER BACK
LOCATION DETAILED: LEFT MID-UPPER BACK
LOCATION DETAILED: LEFT DISTAL PRETIBIAL REGION

## 2022-12-06 ASSESSMENT — LOCATION ZONE DERM
LOCATION ZONE: FACE
LOCATION ZONE: LEG
LOCATION ZONE: TRUNK

## 2023-01-09 ENCOUNTER — APPOINTMENT (OUTPATIENT)
Age: 88
Setting detail: DERMATOLOGY
End: 2023-01-10

## 2023-01-09 DIAGNOSIS — D49.2 NEOPLASM OF UNSPECIFIED BEHAVIOR OF BONE, SOFT TISSUE, AND SKIN: ICD-10-CM

## 2023-01-09 DIAGNOSIS — Z85.828 PERSONAL HISTORY OF OTHER MALIGNANT NEOPLASM OF SKIN: ICD-10-CM

## 2023-01-09 PROCEDURE — 11102 TANGNTL BX SKIN SINGLE LES: CPT

## 2023-01-09 PROCEDURE — 99212 OFFICE O/P EST SF 10 MIN: CPT | Mod: 25

## 2023-01-09 PROCEDURE — OTHER COUNSELING: OTHER

## 2023-01-09 PROCEDURE — OTHER BIOPSY BY SHAVE METHOD: OTHER

## 2023-01-09 ASSESSMENT — LOCATION SIMPLE DESCRIPTION DERM
LOCATION SIMPLE: LEFT PRETIBIAL REGION
LOCATION SIMPLE: LEFT ZYGOMA

## 2023-01-09 ASSESSMENT — LOCATION DETAILED DESCRIPTION DERM
LOCATION DETAILED: LEFT CENTRAL ZYGOMA
LOCATION DETAILED: LEFT DISTAL PRETIBIAL REGION

## 2023-01-09 ASSESSMENT — LOCATION ZONE DERM
LOCATION ZONE: FACE
LOCATION ZONE: LEG

## 2023-01-09 NOTE — PROCEDURE: BIOPSY BY SHAVE METHOD
Hide Anticipated Plan (Based On Presumed Biopsy Results)?: No
X Size Of Lesion In Cm: 0
Dressing: bandage
Detail Level: Detailed
Notification Instructions: Patient will be notified of biopsy results. However, patient instructed to call the office if not contacted within 2 weeks.
Hemostasis: Drysol
Billing Type: Third-Party Bill
Biopsy Method: Dermablade
Type Of Destruction Used: Curettage
Was A Bandage Applied: Yes
Electrodesiccation Text: The wound bed was treated with electrodesiccation after the biopsy was performed.
Anesthesia Volume In Cc (Will Not Render If 0): 0.5
Information: Selecting Yes will display possible errors in your note based on the variables you have selected. This validation is only offered as a suggestion for you. PLEASE NOTE THAT THE VALIDATION TEXT WILL BE REMOVED WHEN YOU FINALIZE YOUR NOTE. IF YOU WANT TO FAX A PRELIMINARY NOTE YOU WILL NEED TO TOGGLE THIS TO 'NO' IF YOU DO NOT WANT IT IN YOUR FAXED NOTE.
Cryotherapy Text: The wound bed was treated with cryotherapy after the biopsy was performed.
Wound Care: Petrolatum
Silver Nitrate Text: The wound bed was treated with silver nitrate after the biopsy was performed.
Post-Care Instructions: I reviewed with the patient in detail post-care instructions. Patient is to keep the biopsy site dry overnight, and then apply bacitracin twice daily until healed. Patient may apply hydrogen peroxide soaks to remove any crusting.
Anesthesia Type: 1% lidocaine with epinephrine
Electrodesiccation And Curettage Text: The wound bed was treated with electrodesiccation and curettage after the biopsy was performed.
Biopsy Type: H and E
Consent: Written consent was obtained and risks were reviewed including but not limited to scarring, infection, bleeding, scabbing, incomplete removal, nerve damage and allergy to anesthesia.
Depth Of Biopsy: dermis
Curettage Text: The wound bed was treated with curettage after the biopsy was performed.
